# Patient Record
Sex: FEMALE | Race: WHITE | NOT HISPANIC OR LATINO | Employment: FULL TIME | URBAN - METROPOLITAN AREA
[De-identification: names, ages, dates, MRNs, and addresses within clinical notes are randomized per-mention and may not be internally consistent; named-entity substitution may affect disease eponyms.]

---

## 2024-10-16 ENCOUNTER — OFFICE VISIT (OUTPATIENT)
Age: 54
End: 2024-10-16
Payer: COMMERCIAL

## 2024-10-16 VITALS
BODY MASS INDEX: 31.03 KG/M2 | HEIGHT: 69 IN | WEIGHT: 209.5 LBS | DIASTOLIC BLOOD PRESSURE: 70 MMHG | SYSTOLIC BLOOD PRESSURE: 120 MMHG | TEMPERATURE: 97.8 F | HEART RATE: 54 BPM

## 2024-10-16 DIAGNOSIS — M35.3 PMR (POLYMYALGIA RHEUMATICA): Chronic | ICD-10-CM

## 2024-10-16 DIAGNOSIS — R79.82 CRP ELEVATED: ICD-10-CM

## 2024-10-16 DIAGNOSIS — M15.0 PRIMARY OSTEOARTHRITIS INVOLVING MULTIPLE JOINTS: Chronic | ICD-10-CM

## 2024-10-16 DIAGNOSIS — R53.83 FATIGUE, UNSPECIFIED TYPE: ICD-10-CM

## 2024-10-16 DIAGNOSIS — Z79.52 CURRENT USE OF STEROID MEDICATION: Chronic | ICD-10-CM

## 2024-10-16 DIAGNOSIS — R76.8 ANA POSITIVE: Primary | ICD-10-CM

## 2024-10-16 DIAGNOSIS — M25.50 ARTHRALGIA, UNSPECIFIED JOINT: ICD-10-CM

## 2024-10-16 PROCEDURE — 99205 OFFICE O/P NEW HI 60 MIN: CPT | Performed by: INTERNAL MEDICINE

## 2024-10-16 RX ORDER — PROPRANOLOL HCL 20 MG
TABLET ORAL
COMMUNITY

## 2024-10-16 RX ORDER — SERTRALINE HYDROCHLORIDE 100 MG/1
TABLET, FILM COATED ORAL
COMMUNITY
Start: 2024-10-09

## 2024-10-16 RX ORDER — ALPRAZOLAM 0.5 MG
TABLET ORAL
COMMUNITY

## 2024-10-16 RX ORDER — PREDNISONE 5 MG/1
TABLET ORAL
COMMUNITY
Start: 2024-08-08

## 2024-10-16 RX ORDER — IBUPROFEN 200 MG
CAPSULE ORAL
COMMUNITY
Start: 2024-08-08

## 2024-10-16 RX ORDER — BREXPIPRAZOLE 1 MG/1
1 TABLET ORAL DAILY
COMMUNITY
Start: 2024-10-09

## 2024-10-16 RX ORDER — CLONIDINE HYDROCHLORIDE 0.1 MG/1
TABLET ORAL
COMMUNITY

## 2024-10-16 RX ORDER — HYDROCHLOROTHIAZIDE 25 MG/1
12.5 TABLET ORAL DAILY
COMMUNITY
Start: 2024-10-09

## 2024-10-16 RX ORDER — PANTOPRAZOLE SODIUM 40 MG/1
TABLET, DELAYED RELEASE ORAL
COMMUNITY
Start: 2024-08-21

## 2024-10-16 NOTE — PROGRESS NOTES
Office Visit       Date: 10/16/2024   Patient Name: Lauren Magdaleno  MRN: [id number]  YOB: 1970    Referring Physician: Daria Conway PA-C     Chief Complaint   Patient presents with    Abnormal Lab     IRINA positive, CRP elevated     Joint Pain    Osteoarthritis    Polymyalgia Rheumatica        History of Present Illness: Lauren Magdaleno is a 53 y.o. female who is here today at the request of Daria Conway PA-C. The referral indicates that she is IRINA positive. There is now concern that she may have an autoimmune illness. Records indicate that on 8/8/24 she saw a Rheumatologist at [facility]. She was put on prednisone for possible PMR. Currently she is taking 15 mg/day of prednisone. She has been on this dose for 3 weeks.  Her CRP has been noted to be elevated.     She has had pain in her legs/arms/shoulders. No swelling today. No muscle pain or weakness. No back or neck problems. No red or hot joints. No morning stiffness.     She notes hair loss. No rash. She has numbness/tingling in the extremities. No scalp tenderness. No headaches. No vision changes. No jaw pain. No lymphadenopathy. No abnormal bruising/bleeding. No chest pain. No GI or  problems. No shortness of breath. No chest pain. No sicca symptoms. She is gaining weight on the steroids. She is fatigued.     No history of gout, psoriasis, or Raynaud's. No history of uveitis, iritis, or scleritis. No oral, nasal, or genital ulcers. No history of blood clots or miscarriage.     She has had a lot of stress with her work.     Medication/treatment/interventions tried include: Tylenol, prednisone, sertraline (Zoloft), She saw [facility], she has seen orthopaedic surgeons, Knee brace, ibuprofen   Studies reviewed included:   8/8/24: CBC was normal, Chloride 100, ALT 8.0, CMP ok otherwise, CRP 0.8, ESR 19.0, CCP Normal, CPK normal  87/25/24: CRP 1.5 (<1.0), ESR 12.0, Hepatitis panel normal, uric acid  5.3  6/19/24: TSH normal, B12 normal, EBV IgM Normal, CCP normal, CRP 15.0 (<8.0), RF negative, IRINA 1:80 nuclear/homogenous, Lyme negative, Blood counts fine, ESR 34 (<30)    MR lower extremity joint only without IV contrast right side  Order: [id number]  Impression    1. No meniscal tear or ligament tear.  2. Mild proximal patellar tendinosis.  3. Chondromalacia patella with partial thickness cartilage defect of  both the medial and lateral femoral condyles.      MR lower extremity joint only without IV contrast right side  Order: [id number]  Impression    1. No meniscal tear or ligament tear.  2. Mild proximal patellar tendinosis.  3. Chondromalacia patella with partial thickness cartilage defect of  both the medial and lateral femoral condyles.              Subjective     Review of Systems   Constitutional:  Positive for fatigue and unexpected weight gain.   HENT: Negative.     Eyes: Negative.    Respiratory: Negative.     Cardiovascular: Negative.    Gastrointestinal: Negative.    Endocrine: Negative.    Genitourinary: Negative.    Musculoskeletal:  Positive for arthralgias.   Skin: Negative.    Allergic/Immunologic: Negative.    Neurological:  Positive for numbness.   Hematological: Negative.    Psychiatric/Behavioral: Negative.  Positive for stress.    All other systems reviewed and are negative.       Past Medical History:   Diagnosis Date    Arthritis        Past Surgical History:   Procedure Laterality Date    APPENDECTOMY      CHOLECYSTECTOMY      HYSTERECTOMY      partial    INTESTINAL BYPASS      resection       Family History   Family history unknown: Yes       Social History     Socioeconomic History    Marital status:    Tobacco Use    Smoking status: Never    Smokeless tobacco: Never   Vaping Use    Vaping status: Never Used   Substance and Sexual Activity    Alcohol use: Defer    Drug use: Defer    Sexual activity: Defer         Current Outpatient Medications:     ALPRAZolam (XANAX) 0.5 MG  "tablet, 1 TABLET (0.5MG) THREE TIMES A DAY AS NEEDED FOR ANXIETY., Disp: , Rfl:     calcium (OS-IRVIN) 600 MG tablet, Take 2 Tabs by mouth Once Daily., Disp: , Rfl:     cloNIDine (CATAPRES) 0.1 MG tablet, Take 0.5-1 TABLET BY MOUTH AT BEDTIME AS NEEDED FOR SLEEP, Disp: , Rfl:     hydroCHLOROthiazide 25 MG tablet, Take 0.5 tablets by mouth Daily., Disp: , Rfl:     pantoprazole (PROTONIX) 40 MG EC tablet, TAKE ONE TABLET BY MOUTH daily BEFORE BREAKFAST. DO NOT CRUSH, CHEW OR SPLIT., Disp: , Rfl:     predniSONE (DELTASONE) 5 MG tablet, Take 3 Tabs by mouth Once Daily., Disp: , Rfl:     propranolol (INDERAL) 20 MG tablet, Take 0.5-1 TABLET BY MOUTH TWICE DAILY AS NEEDED FOR anxiety, Disp: , Rfl:     Rexulti 1 MG tablet, Take 1 tablet by mouth Daily., Disp: , Rfl:     sertraline (ZOLOFT) 100 MG tablet, take one tablet by mouth every day, Disp: , Rfl:     Allergies   Allergen Reactions    Phenergan [Promethazine] Other (See Comments)     Pt states she pass out       I reviewed the patient's chief complaint, history of present illness, review of systems, past medical history, surgical history, family history, social history, medications and allergy list.     Objective      Vitals:    10/16/24 0951   BP: 120/70   BP Location: Left arm   Patient Position: Sitting   Cuff Size: Large Adult   Pulse: 54   Temp: 97.8 °F (36.6 °C)   Weight: 95 kg (209 lb 8 oz)   Height: 175.3 cm (69\")   PainSc:   1     Body mass index is 30.94 kg/m².       Physical Exam     General: Well appearing 53 year old  female. Not in distress. She is ambulating unassisted.   SKIN: No rashes. No alopecia. No subcutaneous nodules. No digital pits or ulcers. No sclerodactyly.   HEENT: NCAT. Conjunctiva clear, no photophobia. No oral or nasal ulcers. Hearing intact.    Pulmonary: Clear to auscultation bilaterally. No wheezing, rales, or rhonchi.  CV: Regular rate and rhythm. No murmurs, rubs, or gallops.   Psych: Normal mood and affect. Alert and " oriented x 3.   Extremities: No cyanosis or edema.   Musculoskeletal: No joint swelling or tenderness to palpation. No warmth or erythema. Normal range of motion of the wrists, ankles, elbows, and knees.   Lymph: No palpable cervical adenopathy    Procedures    Assessment / Plan      Assessment & Plan  Arthralgia, unspecified joint  8/8/24: CBC was normal, Chloride 100, ALT 8.0, CMP ok otherwise, CRP 0.8, ESR 19.0, CCP Normal, CPK normal  87/25/24: CRP 1.5 (<1.0), ESR 12.0, Hepatitis panel normal, uric acid 5.3  6/19/24: TSH normal, B12 normal, EBV IgM Normal, CCP normal, CRP 15.0 (<8.0), RF negative, IRINA 1:80 nuclear/homogenous, Lyme negative, Blood counts fine, ESR 34 (<30)  Medication/treatment/interventions tried include: Tylenol, prednisone, sertraline (Zoloft), She saw [facility], she has seen orthopaedic surgeons, Knee brace, ibuprofen   She has seen a rheumatology group in [facility]. She has an appointment with them again next week.   She is on 15 mg/day of prednisone for presume PMR. See below. She feels better since starting the prednisone.   She at age 53 most likely has/is developing some osteoarthritis. See below.   Her CRP was mildly elevated.   Her IRINA test was 1:80. We will evaluate this further. See below.   Typically < 1:80 is normal. > 1:80 is considered positive. Her result was 1:80. This is a borderline result.   Follow up with us as needed.   She is going to continue to see the rheumatologist in [facility]. She has an appointment to see them next week.   She does not need to have two rheumatologists.   Of course if needed we would be happy to see again.   Fatigue, unspecified type    See below regarding PMR and abnormal labs.     CRP elevated  Although CRP is a sensitive reflector of inflammation, it is not specific. Values between 0.3 and 1 mg/dL may reflect minor degrees of inflammation, such as that seen in periodontitis, but may also reflect obesity, cigarette smoking,  diabetes mellitus, uremia, hypertension, low levels of physical activity, oral hormone replacement therapy, sleep disturbance, chronic fatigue, low alcohol consumption, depression, aging, or other apparently non inflammatory states    IRINA positive  We reviewed the outside labs and discussed them at length. All of the patients questions were answered.   Handout on + IRINA tests was given to the patient to take home.   An IRINA test is a non specific test. While it certainly can be positive in conditions like SLE, scleroderma, myositis, Sjögren's, etc.. It can also be positive in patients with thyroid disease, type 1 diabetes, psoriasis, Celiacs disease, inflammatory bowel disease, etc.. There are also reports of normal/apparently healthy individuals who have been incidentally found to have a +IRINA test. You can also sometimes get a false positive IRINA test.     PMR (polymyalgia rheumatica)  8/8/24: CBC was normal, Chloride 100, ALT 8.0, CMP ok otherwise, CRP 0.8, ESR 19.0, CCP Normal, CPK normal  87/25/24: CRP 1.5 (<1.0), ESR 12.0, Hepatitis panel normal, uric acid 5.3  6/19/24: TSH normal, B12 normal, EBV IgM Normal, CCP normal, CRP 15.0 (<8.0), RF negative, IRINA 1:80 nuclear/homogenous, Lyme negative, Blood counts fine, ESR 34 (<30)  By another provider she was started on prednisone 20 mg/day.   She is now on prednisone 15 mg/day. She has been on this dose for 2-3 weeks. We cannot taper at this point. It is too soon.   No GCA symptoms.   At this point she has been on prednisone for months. I cannot confirm or deny that she has PMR.   I would recommend that she continue the prednisone and taper as recommended by the rheumatology group she is seeing in [facility].   Current use of steroid medication  Prednisone 15 mg/day for presumed PMR  Ideally she will taper off as her condition improves/stabilizes.   She has been on this dose for 2-3 weeks. Too soon to taper today  Typically with PMR we taper the steroids every 6-8  weeks.   I generally recommend that she taper something like as follows  20 mg/day x 8 weeks, 15 mg/day x 8 weeks, 10 mg/day x 8 weeks, 7.5 mg/day x 8 weeks, 5 mg/day x 8 weeks, 2.5 mg/day x 8 weeks, 1 mg/day x 8 weeks, then stop  Primary osteoarthritis involving multiple joints  Tylenol PRN is ok as directed.   She has taken ibuprofen PRN     Follow Up:   Return if symptoms worsen or fail to improve.    Karl Castillo,   [facility] [place]

## 2024-10-16 NOTE — ASSESSMENT & PLAN NOTE
8/8/24: CBC was normal, Chloride 100, ALT 8.0, CMP ok otherwise, CRP 0.8, ESR 19.0, CCP Normal, CPK normal  87/25/24: CRP 1.5 (<1.0), ESR 12.0, Hepatitis panel normal, uric acid 5.3  6/19/24: TSH normal, B12 normal, EBV IgM Normal, CCP normal, CRP 15.0 (<8.0), RF negative, IRINA 1:80 nuclear/homogenous, Lyme negative, Blood counts fine, ESR 34 (<30)  By another provider she was started on prednisone 20 mg/day.   She is now on prednisone 15 mg/day. She has been on this dose for 2-3 weeks. We cannot taper at this point. It is too soon.   No GCA symptoms.   At this point she has been on prednisone for months. I cannot confirm or deny that she has PMR.   I would recommend that she continue the prednisone and taper as recommended by the rheumatology group she is seeing in [facility].

## 2024-10-16 NOTE — ASSESSMENT & PLAN NOTE
Prednisone 15 mg/day for presumed PMR  Ideally she will taper off as her condition improves/stabilizes.   She has been on this dose for 2-3 weeks. Too soon to taper today  Typically with PMR we taper the steroids every 6-8 weeks.   I generally recommend that she taper something like as follows  20 mg/day x 8 weeks, 15 mg/day x 8 weeks, 10 mg/day x 8 weeks, 7.5 mg/day x 8 weeks, 5 mg/day x 8 weeks, 2.5 mg/day x 8 weeks, 1 mg/day x 8 weeks, then stop